# Patient Record
Sex: MALE | Race: OTHER | NOT HISPANIC OR LATINO | URBAN - METROPOLITAN AREA
[De-identification: names, ages, dates, MRNs, and addresses within clinical notes are randomized per-mention and may not be internally consistent; named-entity substitution may affect disease eponyms.]

---

## 2019-03-22 ENCOUNTER — EMERGENCY (EMERGENCY)
Facility: HOSPITAL | Age: 59
LOS: 1 days | End: 2019-03-22
Attending: EMERGENCY MEDICINE
Payer: SELF-PAY

## 2019-03-22 VITALS
RESPIRATION RATE: 18 BRPM | TEMPERATURE: 98 F | HEIGHT: 69 IN | OXYGEN SATURATION: 97 % | SYSTOLIC BLOOD PRESSURE: 152 MMHG | WEIGHT: 240.08 LBS | DIASTOLIC BLOOD PRESSURE: 94 MMHG | HEART RATE: 86 BPM

## 2019-03-22 PROCEDURE — 72100 X-RAY EXAM L-S SPINE 2/3 VWS: CPT | Mod: 26

## 2019-03-22 PROCEDURE — 99283 EMERGENCY DEPT VISIT LOW MDM: CPT

## 2019-03-22 PROCEDURE — 96372 THER/PROPH/DIAG INJ SC/IM: CPT

## 2019-03-22 PROCEDURE — 72100 X-RAY EXAM L-S SPINE 2/3 VWS: CPT

## 2019-03-22 PROCEDURE — 99284 EMERGENCY DEPT VISIT MOD MDM: CPT | Mod: 25

## 2019-03-22 RX ORDER — METHOCARBAMOL 500 MG/1
2 TABLET, FILM COATED ORAL
Qty: 42 | Refills: 0 | OUTPATIENT
Start: 2019-03-22 | End: 2019-03-28

## 2019-03-22 RX ORDER — LIDOCAINE 4 G/100G
1 CREAM TOPICAL
Qty: 3 | Refills: 0 | OUTPATIENT
Start: 2019-03-22 | End: 2019-03-24

## 2019-03-22 RX ORDER — METHOCARBAMOL 500 MG/1
750 TABLET, FILM COATED ORAL ONCE
Qty: 0 | Refills: 0 | Status: COMPLETED | OUTPATIENT
Start: 2019-03-22 | End: 2019-03-22

## 2019-03-22 RX ORDER — IBUPROFEN 200 MG
1 TABLET ORAL
Qty: 21 | Refills: 0 | OUTPATIENT
Start: 2019-03-22 | End: 2019-03-28

## 2019-03-22 RX ORDER — LIDOCAINE 4 G/100G
1 CREAM TOPICAL ONCE
Qty: 0 | Refills: 0 | Status: COMPLETED | OUTPATIENT
Start: 2019-03-22 | End: 2019-03-22

## 2019-03-22 RX ORDER — KETOROLAC TROMETHAMINE 30 MG/ML
30 SYRINGE (ML) INJECTION ONCE
Qty: 0 | Refills: 0 | Status: DISCONTINUED | OUTPATIENT
Start: 2019-03-22 | End: 2019-03-22

## 2019-03-22 RX ADMIN — Medication 30 MILLIGRAM(S): at 22:08

## 2019-03-22 RX ADMIN — Medication 30 MILLIGRAM(S): at 22:53

## 2019-03-22 RX ADMIN — LIDOCAINE 1 PATCH: 4 CREAM TOPICAL at 22:56

## 2019-03-22 RX ADMIN — METHOCARBAMOL 750 MILLIGRAM(S): 500 TABLET, FILM COATED ORAL at 22:08
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
## 2019-03-22 NOTE — ED PROVIDER NOTE - ATTENDING CONTRIBUTION TO CARE
I, Renetta Powell, independently evaluated the patient. The PA made the initial evaluation and discussed history, physical and plan with me and I agree. I examined the patient noting no midline spinal TTP, right lumbar paraspinal TTP with hypertonicity, normal gait, normal sensation and reflexes bilateral lower extremities, and discussed negative XR results. I discussed indications to return to the ED and the importance of proper follow up with PMD. Patient verbalizes understanding and is comfortable with discharge at this time.

## 2019-03-22 NOTE — ED PROVIDER NOTE - IMAGING STUDIES ADDITIONAL INFORMATION FREE TEXT
Imaging studies necessary for evaluation of patient current condition after a detail history and  physical assessment.
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
## 2023-07-12 ENCOUNTER — OFFICE (OUTPATIENT)
Dept: URBAN - METROPOLITAN AREA CLINIC 63 | Facility: CLINIC | Age: 63
Setting detail: OPHTHALMOLOGY
End: 2023-07-12
Payer: COMMERCIAL

## 2023-07-12 DIAGNOSIS — H01.004: ICD-10-CM

## 2023-07-12 DIAGNOSIS — H01.001: ICD-10-CM

## 2023-07-12 DIAGNOSIS — H40.1131: ICD-10-CM

## 2023-07-12 DIAGNOSIS — H35.54: ICD-10-CM

## 2023-07-12 DIAGNOSIS — H01.002: ICD-10-CM

## 2023-07-12 PROCEDURE — 92014 COMPRE OPH EXAM EST PT 1/>: CPT | Performed by: OPHTHALMOLOGY

## 2023-07-12 PROCEDURE — 92133 CPTRZD OPH DX IMG PST SGM ON: CPT | Performed by: OPHTHALMOLOGY

## 2023-07-12 ASSESSMENT — REFRACTION_CURRENTRX
OD_OVR_VA: 20/
OD_AXIS: 126
OS_CYLINDER: -1.50
OS_ADD: +2.00
OD_CYLINDER: -1.00
OS_SPHERE: +0.50
OD_ADD: +2.25
OS_AXIS: 081
OD_VPRISM_DIRECTION: PROGS
OS_VPRISM_DIRECTION: PROGS
OS_OVR_VA: 20/
OD_SPHERE: -0.50

## 2023-07-12 ASSESSMENT — SPHEQUIV_DERIVED
OD_SPHEQUIV: -1
OD_SPHEQUIV: -0.375
OS_SPHEQUIV: 0.125
OS_SPHEQUIV: -0.25

## 2023-07-12 ASSESSMENT — PACHYMETRY
OD_CT_UM: 530
OS_CT_CORRECTION: -1
OS_CT_UM: 555
OD_CT_CORRECTION: 1

## 2023-07-12 ASSESSMENT — REFRACTION_MANIFEST
OS_CYLINDER: -1.50
OS_VA1: 20/30
OD_SPHERE: -0.50
OD_VA2: 20/20-
OD_CYLINDER: -1.00
OS_SPHERE: +0.50
OD_VA1: 20/30
OD_AXIS: 120
OS_VA2: 20/20-
OS_ADD: +2.00
OS_AXIS: 75
OD_ADD: +2.00

## 2023-07-12 ASSESSMENT — VISUAL ACUITY
OD_BCVA: 20/20-2
OS_BCVA: 20/20

## 2023-07-12 ASSESSMENT — SUPERFICIAL PUNCTATE KERATITIS (SPK)
OS_SPK: 1+
OD_SPK: 1+

## 2023-07-12 ASSESSMENT — KERATOMETRY
OD_AXISANGLE_DEGREES: 015
OS_K1POWER_DIOPTERS: 41.75
OS_AXISANGLE_DEGREES: 170
OS_K2POWER_DIOPTERS: 43.25
OD_K1POWER_DIOPTERS: 42.00
OD_K2POWER_DIOPTERS: 43.00

## 2023-07-12 ASSESSMENT — REFRACTION_AUTOREFRACTION
OS_AXIS: 077
OS_SPHERE: +1.50
OD_CYLINDER: -1.75
OS_CYLINDER: -2.75
OD_SPHERE: +0.50
OD_AXIS: 097

## 2023-07-12 ASSESSMENT — AXIALLENGTH_DERIVED
OS_AL: 23.9149
OD_AL: 24.3739
OD_AL: 24.1167
OS_AL: 24.066

## 2023-07-12 ASSESSMENT — TONOMETRY
OS_IOP_MMHG: 10
OD_IOP_MMHG: 10

## 2023-07-12 ASSESSMENT — LID EXAM ASSESSMENTS
OS_BLEPHARITIS: LLL LUL 1+
OD_BLEPHARITIS: RLL RUL 1+

## 2023-07-12 ASSESSMENT — CONFRONTATIONAL VISUAL FIELD TEST (CVF)
OS_FINDINGS: FULL
OD_FINDINGS: FULL

## 2023-07-12 ASSESSMENT — CORNEAL PTERYGIUM: OS_PTERYGIUM: NASAL 2MM

## 2024-04-01 ENCOUNTER — OFFICE (OUTPATIENT)
Dept: URBAN - METROPOLITAN AREA CLINIC 104 | Facility: CLINIC | Age: 64
Setting detail: OPHTHALMOLOGY
End: 2024-04-01
Payer: COMMERCIAL

## 2024-04-01 DIAGNOSIS — H16.223: ICD-10-CM

## 2024-04-01 DIAGNOSIS — H40.1131: ICD-10-CM

## 2024-04-01 DIAGNOSIS — H01.002: ICD-10-CM

## 2024-04-01 DIAGNOSIS — H01.001: ICD-10-CM

## 2024-04-01 DIAGNOSIS — E11.9: ICD-10-CM

## 2024-04-01 DIAGNOSIS — H01.005: ICD-10-CM

## 2024-04-01 DIAGNOSIS — H11.042: ICD-10-CM

## 2024-04-01 DIAGNOSIS — H25.13: ICD-10-CM

## 2024-04-01 DIAGNOSIS — H01.004: ICD-10-CM

## 2024-04-01 DIAGNOSIS — H11.153: ICD-10-CM

## 2024-04-01 DIAGNOSIS — H35.54: ICD-10-CM

## 2024-04-01 PROCEDURE — 99213 OFFICE O/P EST LOW 20 MIN: CPT | Performed by: OPHTHALMOLOGY

## 2024-04-01 PROCEDURE — 92083 EXTENDED VISUAL FIELD XM: CPT | Performed by: OPHTHALMOLOGY

## 2024-04-01 PROCEDURE — 92134 CPTRZ OPH DX IMG PST SGM RTA: CPT | Performed by: OPHTHALMOLOGY

## 2024-04-01 ASSESSMENT — LID EXAM ASSESSMENTS
OD_BLEPHARITIS: RLL RUL 1+
OS_BLEPHARITIS: LLL LUL 1+

## 2024-08-05 ENCOUNTER — OFFICE (OUTPATIENT)
Dept: URBAN - METROPOLITAN AREA CLINIC 104 | Facility: CLINIC | Age: 64
Setting detail: OPHTHALMOLOGY
End: 2024-08-05
Payer: COMMERCIAL

## 2024-08-05 DIAGNOSIS — H16.223: ICD-10-CM

## 2024-08-05 DIAGNOSIS — Q14.1: ICD-10-CM

## 2024-08-05 DIAGNOSIS — E11.9: ICD-10-CM

## 2024-08-05 DIAGNOSIS — H01.002: ICD-10-CM

## 2024-08-05 DIAGNOSIS — H01.001: ICD-10-CM

## 2024-08-05 DIAGNOSIS — H01.005: ICD-10-CM

## 2024-08-05 DIAGNOSIS — H01.004: ICD-10-CM

## 2024-08-05 DIAGNOSIS — H25.13: ICD-10-CM

## 2024-08-05 DIAGNOSIS — H11.042: ICD-10-CM

## 2024-08-05 DIAGNOSIS — H35.54: ICD-10-CM

## 2024-08-05 DIAGNOSIS — H40.1131: ICD-10-CM

## 2024-08-05 DIAGNOSIS — H11.153: ICD-10-CM

## 2024-08-05 PROCEDURE — 92250 FUNDUS PHOTOGRAPHY W/I&R: CPT | Performed by: OPHTHALMOLOGY

## 2024-08-05 PROCEDURE — 92014 COMPRE OPH EXAM EST PT 1/>: CPT | Performed by: OPHTHALMOLOGY

## 2024-08-05 ASSESSMENT — CONFRONTATIONAL VISUAL FIELD TEST (CVF)
OD_FINDINGS: FULL
OS_FINDINGS: FULL

## 2024-08-05 ASSESSMENT — LID EXAM ASSESSMENTS
OD_BLEPHARITIS: RLL RUL 1+
OS_BLEPHARITIS: LLL LUL 1+

## 2024-12-30 ENCOUNTER — OFFICE (OUTPATIENT)
Dept: URBAN - METROPOLITAN AREA CLINIC 104 | Facility: CLINIC | Age: 64
Setting detail: OPHTHALMOLOGY
End: 2024-12-30
Payer: COMMERCIAL

## 2024-12-30 DIAGNOSIS — H11.153: ICD-10-CM

## 2024-12-30 DIAGNOSIS — E11.9: ICD-10-CM

## 2024-12-30 DIAGNOSIS — H16.223: ICD-10-CM

## 2024-12-30 DIAGNOSIS — H01.005: ICD-10-CM

## 2024-12-30 DIAGNOSIS — H01.002: ICD-10-CM

## 2024-12-30 DIAGNOSIS — Q14.1: ICD-10-CM

## 2024-12-30 DIAGNOSIS — H40.1131: ICD-10-CM

## 2024-12-30 DIAGNOSIS — H01.001: ICD-10-CM

## 2024-12-30 DIAGNOSIS — H35.54: ICD-10-CM

## 2024-12-30 DIAGNOSIS — H25.13: ICD-10-CM

## 2024-12-30 DIAGNOSIS — H11.042: ICD-10-CM

## 2024-12-30 DIAGNOSIS — H01.004: ICD-10-CM

## 2024-12-30 PROCEDURE — 92133 CPTRZD OPH DX IMG PST SGM ON: CPT | Performed by: OPHTHALMOLOGY

## 2024-12-30 PROCEDURE — 99213 OFFICE O/P EST LOW 20 MIN: CPT | Performed by: OPHTHALMOLOGY

## 2024-12-30 ASSESSMENT — REFRACTION_AUTOREFRACTION
OS_CYLINDER: -1.50
OD_SPHERE: +0.50
OD_AXIS: 104
OD_CYLINDER: -1.50
OS_SPHERE: +0.75
OS_AXIS: 073

## 2024-12-30 ASSESSMENT — SUPERFICIAL PUNCTATE KERATITIS (SPK)
OD_SPK: 1+
OS_SPK: 1+

## 2024-12-30 ASSESSMENT — REFRACTION_CURRENTRX
OD_ADD: +2.00
OS_OVR_VA: 20/
OS_AXIS: 75
OS_CYLINDER: -1.25
OS_ADD: +2.00
OD_AXIS: 117
OD_OVR_VA: 20/
OS_SPHERE: +0.50
OD_SPHERE: -0.50
OD_CYLINDER: -1.00

## 2024-12-30 ASSESSMENT — VISUAL ACUITY
OD_BCVA: 20/25+3
OS_BCVA: 20/30-

## 2024-12-30 ASSESSMENT — KERATOMETRY
OS_K1POWER_DIOPTERS: 41.11
OD_AXISANGLE_DEGREES: 011
OD_K1POWER_DIOPTERS: 41.67
OS_AXISANGLE_DEGREES: 169
OS_K2POWER_DIOPTERS: 43.83
OD_K2POWER_DIOPTERS: 43.32

## 2024-12-30 ASSESSMENT — CONFRONTATIONAL VISUAL FIELD TEST (CVF)
OD_FINDINGS: FULL
OS_FINDINGS: FULL

## 2024-12-30 ASSESSMENT — PACHYMETRY
OD_CT_CORRECTION: 1
OS_CT_CORRECTION: -1
OS_CT_UM: 555
OD_CT_UM: 530

## 2024-12-30 ASSESSMENT — LID EXAM ASSESSMENTS
OD_BLEPHARITIS: RLL RUL 1+
OS_BLEPHARITIS: LLL LUL 1+

## 2024-12-30 ASSESSMENT — TONOMETRY
OD_IOP_MMHG: 15
OS_IOP_MMHG: 16

## 2024-12-30 ASSESSMENT — CORNEAL PTERYGIUM: OS_PTERYGIUM: NASAL 2MM

## 2025-05-05 ENCOUNTER — OFFICE (OUTPATIENT)
Dept: URBAN - METROPOLITAN AREA CLINIC 104 | Facility: CLINIC | Age: 65
Setting detail: OPHTHALMOLOGY
End: 2025-05-05
Payer: COMMERCIAL

## 2025-05-05 DIAGNOSIS — E11.9: ICD-10-CM

## 2025-05-05 DIAGNOSIS — H35.54: ICD-10-CM

## 2025-05-05 DIAGNOSIS — H40.1131: ICD-10-CM

## 2025-05-05 DIAGNOSIS — Q14.1: ICD-10-CM

## 2025-05-05 PROCEDURE — 92134 CPTRZ OPH DX IMG PST SGM RTA: CPT | Performed by: OPHTHALMOLOGY

## 2025-05-05 PROCEDURE — 99213 OFFICE O/P EST LOW 20 MIN: CPT | Performed by: OPHTHALMOLOGY

## 2025-05-05 PROCEDURE — 92083 EXTENDED VISUAL FIELD XM: CPT | Performed by: OPHTHALMOLOGY

## 2025-05-05 ASSESSMENT — KERATOMETRY
OD_K1POWER_DIOPTERS: 41.87
OD_K2POWER_DIOPTERS: 43.27
OS_AXISANGLE_DEGREES: 173
OS_K1POWER_DIOPTERS: 42.19
OD_AXISANGLE_DEGREES: 008
OS_K2POWER_DIOPTERS: 43.49

## 2025-05-05 ASSESSMENT — VISUAL ACUITY
OD_BCVA: 20/25+2
OS_BCVA: 20/20

## 2025-05-05 ASSESSMENT — TONOMETRY
OD_IOP_MMHG: 14
OS_IOP_MMHG: 16

## 2025-05-05 ASSESSMENT — PACHYMETRY
OD_CT_UM: 530
OS_CT_CORRECTION: -1
OD_CT_CORRECTION: 1
OS_CT_UM: 555

## 2025-05-05 ASSESSMENT — REFRACTION_AUTOREFRACTION
OD_AXIS: 105
OD_SPHERE: +0.75
OS_AXIS: 083
OD_CYLINDER: -1.75
OS_CYLINDER: -2.50
OS_SPHERE: +1.50

## 2025-05-05 ASSESSMENT — REFRACTION_CURRENTRX
OD_OVR_VA: 20/
OD_CYLINDER: -0.75
OD_SPHERE: -0.50
OD_AXIS: 117
OD_ADD: +2.00
OS_OVR_VA: 20/
OS_ADD: +2.00
OS_CYLINDER: -1.25
OS_AXIS: 68
OS_SPHERE: +0.50

## 2025-05-05 ASSESSMENT — SUPERFICIAL PUNCTATE KERATITIS (SPK)
OS_SPK: 1+
OD_SPK: 1+

## 2025-05-05 ASSESSMENT — CORNEAL PTERYGIUM: OS_PTERYGIUM: NASAL 2MM

## 2025-05-05 ASSESSMENT — CONFRONTATIONAL VISUAL FIELD TEST (CVF)
OD_FINDINGS: FULL
OS_FINDINGS: FULL

## 2025-05-05 ASSESSMENT — LID EXAM ASSESSMENTS
OD_BLEPHARITIS: RLL RUL 1+
OS_BLEPHARITIS: LLL LUL 1+